# Patient Record
Sex: MALE | Race: WHITE | NOT HISPANIC OR LATINO | Employment: OTHER | URBAN - METROPOLITAN AREA
[De-identification: names, ages, dates, MRNs, and addresses within clinical notes are randomized per-mention and may not be internally consistent; named-entity substitution may affect disease eponyms.]

---

## 2021-06-11 ENCOUNTER — APPOINTMENT (RX ONLY)
Dept: URBAN - METROPOLITAN AREA OTHER 6 | Facility: OTHER | Age: 66
Setting detail: DERMATOLOGY
End: 2021-06-11

## 2021-06-11 VITALS — TEMPERATURE: 98 F

## 2021-06-11 DIAGNOSIS — L57.8 OTHER SKIN CHANGES DUE TO CHRONIC EXPOSURE TO NONIONIZING RADIATION: ICD-10-CM | Status: INADEQUATELY CONTROLLED

## 2021-06-11 DIAGNOSIS — D22 MELANOCYTIC NEVI: ICD-10-CM | Status: STABLE

## 2021-06-11 DIAGNOSIS — L30.9 DERMATITIS, UNSPECIFIED: ICD-10-CM | Status: INADEQUATELY CONTROLLED

## 2021-06-11 DIAGNOSIS — L20.89 OTHER ATOPIC DERMATITIS: ICD-10-CM

## 2021-06-11 DIAGNOSIS — D18.0 HEMANGIOMA: ICD-10-CM | Status: STABLE

## 2021-06-11 PROBLEM — D22.61 MELANOCYTIC NEVI OF RIGHT UPPER LIMB, INCLUDING SHOULDER: Status: ACTIVE | Noted: 2021-06-11

## 2021-06-11 PROBLEM — L20.84 INTRINSIC (ALLERGIC) ECZEMA: Status: ACTIVE | Noted: 2021-06-11

## 2021-06-11 PROBLEM — D18.01 HEMANGIOMA OF SKIN AND SUBCUTANEOUS TISSUE: Status: ACTIVE | Noted: 2021-06-11

## 2021-06-11 PROCEDURE — ? PRESCRIPTION

## 2021-06-11 PROCEDURE — ? SUNSCREEN RECOMMENDATIONS

## 2021-06-11 PROCEDURE — 99204 OFFICE O/P NEW MOD 45 MIN: CPT

## 2021-06-11 PROCEDURE — ? TREATMENT REGIMEN

## 2021-06-11 PROCEDURE — ? COUNSELING

## 2021-06-11 RX ORDER — CLOBETASOL PROPIONATE 0.5 MG/G
CREAM TOPICAL TWICE A DAY
Qty: 1 | Refills: 1 | Status: ERX | COMMUNITY
Start: 2021-06-11

## 2021-06-11 RX ADMIN — CLOBETASOL PROPIONATE: 0.5 CREAM TOPICAL at 00:00

## 2021-06-11 ASSESSMENT — LOCATION SIMPLE DESCRIPTION DERM
LOCATION SIMPLE: RIGHT HAND
LOCATION SIMPLE: INFERIOR FOREHEAD
LOCATION SIMPLE: RIGHT UPPER BACK
LOCATION SIMPLE: RIGHT FOREARM
LOCATION SIMPLE: LEFT POSTERIOR THIGH
LOCATION SIMPLE: CHEST
LOCATION SIMPLE: LEFT THIGH
LOCATION SIMPLE: RIGHT THIGH

## 2021-06-11 ASSESSMENT — LOCATION DETAILED DESCRIPTION DERM
LOCATION DETAILED: RIGHT SUPERIOR UPPER BACK
LOCATION DETAILED: RIGHT ANTERIOR DISTAL THIGH
LOCATION DETAILED: INFERIOR MID FOREHEAD
LOCATION DETAILED: LEFT DISTAL POSTERIOR THIGH
LOCATION DETAILED: RIGHT ULNAR PALM
LOCATION DETAILED: STERNUM
LOCATION DETAILED: LEFT ANTERIOR DISTAL THIGH
LOCATION DETAILED: RIGHT MEDIAL SUPERIOR CHEST
LOCATION DETAILED: RIGHT VENTRAL PROXIMAL FOREARM
LOCATION DETAILED: LEFT MEDIAL SUPERIOR CHEST
LOCATION DETAILED: RIGHT MEDIAL INFERIOR CHEST
LOCATION DETAILED: RIGHT ANTERIOR PROXIMAL THIGH
LOCATION DETAILED: LEFT ANTERIOR PROXIMAL THIGH
LOCATION DETAILED: RIGHT SUPERIOR MEDIAL UPPER BACK

## 2021-06-11 ASSESSMENT — LOCATION ZONE DERM
LOCATION ZONE: FACE
LOCATION ZONE: ARM
LOCATION ZONE: LEG
LOCATION ZONE: HAND
LOCATION ZONE: TRUNK

## 2021-06-11 NOTE — PROCEDURE: TREATMENT REGIMEN
Plan: Patient was just treated for cellulitis from visit to [facility] 2 weeks ago (finished course of Keflex). Pt reports improvement in condition but there is still itching and redness throughout the body. Based on presentation, suspicious for eczematous reaction vs. drug eruption. \\n\\nRx Clobetasol 0.05% cream. Apply twice a day to itchy areas until next follow up appt in 2 weeks. Moisturize thoroughly throughout body with moisturizing cream. Encouraged patient to moisturize while skin is still damp.\\n\\nPt was advised to focus on getting his back cleared up- if issue is unresolved at 2 week follow up, we can consider a biopsy and allergy patch testing.\\n \\nFollow up in 2 weeks to recheck.
Initiate Treatment: Clobetasol 0.05% cream. Apply bid to affected areas that are most itchy.
Otc Regimen: Anti histamine-zyrtec, Allegra, Claritin are possible options for patient to consider.
Detail Level: Zone

## 2021-06-11 NOTE — HPI: FULL BODY SKIN EXAMINATION
How Severe Are Your Spot(S)?: moderate
What Type Of Note Output Would You Prefer (Optional)?: Bullet Format
What Is The Reason For Today's Visit?: Full Body Skin Examination
What Is The Reason For Today's Visit? (Being Monitored For X): concerning skin lesions on an annual basis
Additional History: Pt has been dealing with eczema for years. Last fall, took diclofomac. This triggered rash reactions. 1 month ago-got sunburnt. 1 wk later-[employer] vaccine—cellulitis.\\nMost recent PE April 2020- WNL. No weight loss. No swollen glands. No health changes.